# Patient Record
Sex: FEMALE | Race: WHITE | NOT HISPANIC OR LATINO | URBAN - METROPOLITAN AREA
[De-identification: names, ages, dates, MRNs, and addresses within clinical notes are randomized per-mention and may not be internally consistent; named-entity substitution may affect disease eponyms.]

---

## 2017-07-28 ENCOUNTER — IMPORTED ENCOUNTER (OUTPATIENT)
Dept: URBAN - METROPOLITAN AREA CLINIC 1 | Facility: CLINIC | Age: 82
End: 2017-07-28

## 2017-07-28 PROBLEM — H16.143: Noted: 2017-07-28

## 2017-07-28 PROBLEM — H26.493: Noted: 2017-07-28

## 2017-07-28 PROBLEM — H04.123: Noted: 2017-07-28

## 2017-07-28 PROBLEM — H35.363: Noted: 2017-07-28

## 2017-07-28 PROBLEM — Z96.1: Noted: 2017-07-28

## 2017-07-28 PROCEDURE — 92014 COMPRE OPH EXAM EST PT 1/>: CPT

## 2017-07-28 PROCEDURE — 92015 DETERMINE REFRACTIVE STATE: CPT

## 2017-07-28 NOTE — PATIENT DISCUSSION
1.  Macular Drusen OU- Observe. 2.  ALYSSA w/ PEK OU- Recommend increase ATs TID OU. Consider punctal plugs without improvement. 3.  PCO OU: (Posterior Capsule Opacification)   Observe and consider yag cap when pt feels pco visually significant and visual acuity decreases to appropriate level. 4. Pseudophakia OU MRX for glasses givenReturn for an appointment in 6 months 10/DFE/glare with Dr. Tio De La Rosa.

## 2018-02-06 ENCOUNTER — IMPORTED ENCOUNTER (OUTPATIENT)
Dept: URBAN - METROPOLITAN AREA CLINIC 1 | Facility: CLINIC | Age: 83
End: 2018-02-06

## 2018-02-06 PROBLEM — H04.123: Noted: 2018-02-06

## 2018-02-06 PROBLEM — H43.811: Noted: 2018-02-06

## 2018-02-06 PROBLEM — H35.3131: Noted: 2018-02-06

## 2018-02-06 PROBLEM — H35.363: Noted: 2018-02-06

## 2018-02-06 PROBLEM — H16.143: Noted: 2018-02-06

## 2018-02-06 PROBLEM — H26.493: Noted: 2018-02-06

## 2018-02-06 PROBLEM — Z96.1: Noted: 2018-02-06

## 2018-02-06 PROCEDURE — 92012 INTRM OPH EXAM EST PATIENT: CPT

## 2018-02-06 NOTE — PATIENT DISCUSSION
1.  ARMD OU early/dry/New. Importance of daily AREDS II study multivitamin and Amsler Grid checks discussed with patient. Patient to follow-up immediately with any new onset of decreased vision and/or metamorphopsia. 2. ALYSSA w/ PEK OU-The increase of artificial tears OU to TID were recommended. Consider plugs if no improvement. 3. PCO OU: Observe and consider yag cap when pt feels pco visually significant and visual acuity decreases to appropriate level. 4. 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 5.  Pseudophakia OU - Doing well. 6. Return for an appointment for a 30/GLARE/MRX IN 6 MONTHS with Dr. Tammy Allen.

## 2018-08-13 ENCOUNTER — IMPORTED ENCOUNTER (OUTPATIENT)
Dept: URBAN - METROPOLITAN AREA CLINIC 1 | Facility: CLINIC | Age: 83
End: 2018-08-13

## 2018-08-13 PROBLEM — H35.3131: Noted: 2018-08-13

## 2018-08-13 PROCEDURE — 92015 DETERMINE REFRACTIVE STATE: CPT

## 2018-08-13 PROCEDURE — 92014 COMPRE OPH EXAM EST PT 1/>: CPT

## 2018-08-13 NOTE — PATIENT DISCUSSION
1.  ARMD OU Early/dry/stable. Importance of daily AREDS II study multivitamin and Amsler Grid checks discussed with patient. Patient to follow-up immediately with any new onset of decreased vision and/or metamorphopsia. 2. ALYSSA w/ PEK OU- Cont ATs TID OU Routinely. 3.  PCO OU: Observe  4. PVD w/o Tear OD-  stable RD precautions. 5.  Pseudophakia OU - Doing well. Letter to PCP Return for an appointment in 6 mo 10 dfe with Dr. Courtney Chester.

## 2019-02-19 ENCOUNTER — IMPORTED ENCOUNTER (OUTPATIENT)
Dept: URBAN - METROPOLITAN AREA CLINIC 1 | Facility: CLINIC | Age: 84
End: 2019-02-19

## 2019-02-19 PROBLEM — H04.123: Noted: 2019-02-19

## 2019-02-19 PROBLEM — H35.3131: Noted: 2019-02-19

## 2019-02-19 PROBLEM — H16.143: Noted: 2019-02-19

## 2019-02-19 PROCEDURE — 92012 INTRM OPH EXAM EST PATIENT: CPT

## 2019-02-19 PROCEDURE — 92015 DETERMINE REFRACTIVE STATE: CPT

## 2019-02-19 NOTE — PATIENT DISCUSSION
1.  ARMD OU Early/dry/stable. Importance of daily AREDS II study multivitamin and Amsler Grid checks discussed with patient. Patient to follow-up immediately with any new onset of decreased vision and/or metamorphopsia. 2. ALYSSA w/ PEK OU- Cont ATs TID OU Routinely. 3.  PCO OU: Observe  4. PVD w/o Tear OD-  stable RD precautions. 5.  Pseudophakia OUReturn for an appointment in 6 mo 27 with Dr. Gregg Diaz.

## 2019-10-03 ENCOUNTER — IMPORTED ENCOUNTER (OUTPATIENT)
Dept: URBAN - METROPOLITAN AREA CLINIC 1 | Facility: CLINIC | Age: 84
End: 2019-10-03

## 2019-10-03 PROBLEM — H04.123: Noted: 2019-10-03

## 2019-10-03 PROBLEM — H16.143: Noted: 2019-10-03

## 2019-10-03 PROBLEM — H35.3131: Noted: 2019-10-03

## 2019-10-03 PROCEDURE — 92014 COMPRE OPH EXAM EST PT 1/>: CPT

## 2019-10-03 PROCEDURE — 92015 DETERMINE REFRACTIVE STATE: CPT

## 2020-07-24 ENCOUNTER — IMPORTED ENCOUNTER (OUTPATIENT)
Dept: URBAN - METROPOLITAN AREA CLINIC 1 | Facility: CLINIC | Age: 85
End: 2020-07-24

## 2020-07-24 PROBLEM — H26.493: Noted: 2020-07-24

## 2020-07-24 PROBLEM — H35.3131: Noted: 2020-07-24

## 2020-07-24 PROBLEM — H04.123: Noted: 2020-07-24

## 2020-07-24 PROBLEM — H01.004: Noted: 2020-07-24

## 2020-07-24 PROBLEM — H16.143: Noted: 2020-07-24

## 2020-07-24 PROBLEM — H01.001: Noted: 2020-07-24

## 2020-07-24 PROCEDURE — 92012 INTRM OPH EXAM EST PATIENT: CPT

## 2020-07-24 NOTE — PATIENT DISCUSSION
1.  ARMD OU Early/dry/stable. Importance of daily AREDS II study multivitamin and Amsler Grid checks discussed with patient. Patient to follow-up immediately with any new onset of decreased vision and/or metamorphopsia. 2. ALYSSA w/ PEK OU -- Cont ATs TID OU Routinely. 3.  PCO OU -- Observe  4. Anterior Blepharitis OU - Daily Hot compresses and lid scrubs/baby shampoo were recommended  5. PVD w/o Tear OD --  stable RD precautions. 6.  Pseudophakia OUReturn for an appointment in 6 months for a 30/glare with Dr. Marianne Javier.

## 2021-02-02 ENCOUNTER — IMPORTED ENCOUNTER (OUTPATIENT)
Dept: URBAN - METROPOLITAN AREA CLINIC 1 | Facility: CLINIC | Age: 86
End: 2021-02-02

## 2021-02-02 PROBLEM — H16.143: Noted: 2021-02-02

## 2021-02-02 PROBLEM — H43.813: Noted: 2021-02-02

## 2021-02-02 PROBLEM — H26.493: Noted: 2021-02-02

## 2021-02-02 PROBLEM — H04.123: Noted: 2021-02-02

## 2021-02-02 PROBLEM — H35.3131: Noted: 2021-02-02

## 2021-02-02 PROCEDURE — 92014 COMPRE OPH EXAM EST PT 1/>: CPT

## 2021-02-02 PROCEDURE — 92015 DETERMINE REFRACTIVE STATE: CPT

## 2021-02-02 NOTE — PATIENT DISCUSSION
1.  ARMD OU early/dry/stable. Importance of daily AREDS II study multivitamin and Amsler Grid checks discussed with patient. Patient to follow-up immediately with any new onset of decreased vision and/or metamorphopsia. 2. PCO OU -- Visually Significant *secondary to glare*; schedule YAG Cap. Pros cons risks and benefits. 3.  ALYSSA w/ PEK OU -- Cont ATs TID OU Routinely. 4.  Anterior Blepharitis OU -- Cont daily Hot compresses and lid scrubs/baby shampoo were recommended  5. PVD w/o Tear OD --  stable RD precautions. 6.  Pseudophakia OUReturn for an appointment YAG Cap OD then OS with Dr. Amara Redding. Otherwise follow up 6 months for a DFE.

## 2021-02-05 ENCOUNTER — IMPORTED ENCOUNTER (OUTPATIENT)
Dept: URBAN - METROPOLITAN AREA CLINIC 1 | Facility: CLINIC | Age: 86
End: 2021-02-05

## 2021-02-05 PROCEDURE — 66821 AFTER CATARACT LASER SURGERY: CPT

## 2021-02-09 PROBLEM — H26.491: Noted: 2021-02-09

## 2021-02-12 ENCOUNTER — IMPORTED ENCOUNTER (OUTPATIENT)
Dept: URBAN - METROPOLITAN AREA CLINIC 1 | Facility: CLINIC | Age: 86
End: 2021-02-12

## 2021-02-12 PROBLEM — H26.492: Noted: 2021-02-12

## 2021-02-12 PROCEDURE — 66821 AFTER CATARACT LASER SURGERY: CPT

## 2021-03-22 ENCOUNTER — IMPORTED ENCOUNTER (OUTPATIENT)
Dept: URBAN - METROPOLITAN AREA CLINIC 1 | Facility: CLINIC | Age: 86
End: 2021-03-22

## 2021-03-22 PROCEDURE — 99024 POSTOP FOLLOW-UP VISIT: CPT

## 2021-03-22 NOTE — PATIENT DISCUSSION
PO YAG Cap OU: good result. MRX given. Return for an appointment in August DFE with Dr. Lola Landeros.

## 2021-08-12 ENCOUNTER — IMPORTED ENCOUNTER (OUTPATIENT)
Dept: URBAN - METROPOLITAN AREA CLINIC 1 | Facility: CLINIC | Age: 86
End: 2021-08-12

## 2021-08-12 PROBLEM — H35.3131: Noted: 2021-08-12

## 2021-08-12 PROCEDURE — 99213 OFFICE O/P EST LOW 20 MIN: CPT

## 2021-08-12 NOTE — PATIENT DISCUSSION
ALYSSA w/ PEK OU - Recommend ATs QID OU Routinely. 3.  Anterior Blepharitis OU - Cont daily Hot compresses and lid scrubs/baby shampoo were recommended  4. PVD w/o Tear OD -  stable RD precautions. 5.  Pseudophakia OU - H/o YAG Cap OUReturn for an appointment in 6 months 27 with Dr. Seth Reynolds.

## 2021-08-12 NOTE — PATIENT DISCUSSION
1.  ARMD OU Early/dry/stable. Importance of daily AREDS II study multivitamin and Amsler Grid checks discussed with patient. Patient to follow-up immediately with any new onset of decreased vision and/or metamorphopsia. 2. ALYSSA w/ PEK OU - Recommend ATs QID OU Routinely. 3.  Anterior Blepharitis OU - Cont daily Hot compresses and lid scrubs/baby shampoo were recommended  4. PVD w/o Tear OD -  stable RD precautions. 5.  Pseudophakia OU - H/o YAG Cap OUReturn for an appointment in 6 months 27 with Dr. Sudhakar Eric.

## 2022-02-17 ENCOUNTER — COMPREHENSIVE EXAM (OUTPATIENT)
Dept: URBAN - METROPOLITAN AREA CLINIC 1 | Facility: CLINIC | Age: 87
End: 2022-02-17

## 2022-02-17 DIAGNOSIS — Z96.1: ICD-10-CM

## 2022-02-17 DIAGNOSIS — H02.821: ICD-10-CM

## 2022-02-17 DIAGNOSIS — H04.123: ICD-10-CM

## 2022-02-17 DIAGNOSIS — H01.021: ICD-10-CM

## 2022-02-17 DIAGNOSIS — H35.3131: ICD-10-CM

## 2022-02-17 DIAGNOSIS — H43.813: ICD-10-CM

## 2022-02-17 DIAGNOSIS — H01.024: ICD-10-CM

## 2022-02-17 PROCEDURE — 92015 DETERMINE REFRACTIVE STATE: CPT

## 2022-02-17 PROCEDURE — 99214 OFFICE O/P EST MOD 30 MIN: CPT

## 2022-02-17 ASSESSMENT — TONOMETRY
OS_IOP_MMHG: 10
OD_IOP_MMHG: 10

## 2022-02-17 ASSESSMENT — VISUAL ACUITY
OD_SC: 20/60 -1
OS_SC: 20/40 +1

## 2022-04-02 ASSESSMENT — TONOMETRY
OS_IOP_MMHG: 12
OD_IOP_MMHG: 10
OD_IOP_MMHG: 12
OS_IOP_MMHG: 10
OD_IOP_MMHG: 14
OS_IOP_MMHG: 13
OS_IOP_MMHG: 12
OS_IOP_MMHG: 11
OD_IOP_MMHG: 11
OD_IOP_MMHG: 13
OS_IOP_MMHG: 11
OD_IOP_MMHG: 10
OD_IOP_MMHG: 11
OS_IOP_MMHG: 10
OS_IOP_MMHG: 12
OD_IOP_MMHG: 10
OD_IOP_MMHG: 12
OS_IOP_MMHG: 13

## 2022-04-02 ASSESSMENT — VISUAL ACUITY
OS_SC: 20/25
OS_SC: 20/20
OS_SC: 20/20-1
OS_SC: 20/30
OS_GLARE: 20/80
OD_SC: 20/25
OS_SC: 20/25
OD_SC: 20/20
OD_GLARE: 20/80
OS_CC: J1+
OD_SC: 20/40
OD_SC: 20/60
OS_SC: 20/25
OD_SC: 20/25
OS_SC: 20/20
OS_CC: 20/40
OD_CC: 20/40
OD_SC: 20/30
OS_GLARE: 20/100
OD_PH: SC 20/30
OD_SC: 20/25
OD_CC: J1+
OD_GLARE: 20/100
OD_GLARE: 20/80
OS_SC: 20/25
OD_CC: 20/60-2
OS_CC: 20/30
OD_SC: 20/25
OS_GLARE: 20/80

## 2022-04-02 ASSESSMENT — KERATOMETRY
OS_K1POWER_DIOPTERS: 45.25
OS_AXISANGLE_DEGREES: 145
OD_AXISANGLE2_DEGREES: 051
OS_AXISANGLE2_DEGREES: 055
OD_AXISANGLE_DEGREES: 141
OD_K2POWER_DIOPTERS: 44.75
OD_K1POWER_DIOPTERS: 46.25
OS_K2POWER_DIOPTERS: 45.75